# Patient Record
Sex: MALE | Race: WHITE | NOT HISPANIC OR LATINO | Employment: FULL TIME | URBAN - METROPOLITAN AREA
[De-identification: names, ages, dates, MRNs, and addresses within clinical notes are randomized per-mention and may not be internally consistent; named-entity substitution may affect disease eponyms.]

---

## 2022-03-31 ENCOUNTER — OFFICE VISIT (OUTPATIENT)
Dept: FAMILY MEDICINE | Facility: CLINIC | Age: 32
End: 2022-03-31

## 2022-03-31 DIAGNOSIS — Z78.9 PARTICIPANT IN HEALTH AND WELLNESS PLAN: Primary | ICD-10-CM

## 2022-03-31 PROCEDURE — 99207 PR NO CHARGE LOS: CPT

## 2022-03-31 NOTE — PROGRESS NOTES
"Discussion:    Biometric F/U:  213.5 lbs, BMI 32, /90, Triglycerides 194, Glucose 108, Non-HDL n/a, HDL Ratio n/a, , HDL <15     Discussed implications of larger waist circumference/ BMI & behaviors to help reduce risks & reduce body fat (fresh foods = produce, lean meats, healthy fats/ vs processed foods, limit flour & sugar intake, regular exercise, sleep, manage stress).     Discussed effect elevated BP has on the body & things that can affect BP (exercise, stress, weight, diet, sleep, caffeine, pain, nicotine)    Discussed dangers of elevated levels of triglycerides (metabolic syndrome, increase risk for cardiovascular disease, elevated BP, lowered HDL, elevated glucose, increase belly fat) & behaviors to help decrease triglycerides (fresh foods = produce, lean meats, healthy fats/ vs processed foods, limit flour, sugar & alcohol intake, avoid trans fats, manage weight, increase physical activity).     Discussed dangers of elevated glucose levels & the impact sugar & highly processed carbohydrates has on the body (increase inflammation, dyslipidemia, increased risk for heart attacks, weight gain, premature death, memory in the brain, etc.).     Lifestyle Habits:  Diet/ Eating Habits: produce - 1-2 servings a day (carrots, broccoli, bell peppers, spring mix (no spinach as he's allergic), pasta 1d/week, trying to get in weekly meal planning with 9 month old at home, limited bread due to fatty liver disease. Beverages -  diet pop a couple a week, alcohol - light beer 1d/week. Protein -  Protein shake, ground flaxseed, fiber, chicken. Reports loves desert & ice cream, \"I am aware that I need to clean this up.\"    Physical Activity: no activity right now, hoping to get out for walks with baby once weather improves. Weight machine at home that can use    Sleep: 7-7.5 hrs    Stress: 3/10    Goals:  - Increase vegetable intake  - Be more cognitive about sweets & my sugar intake  - Exercise " 3d/week    Motivators:   - My daughter  - Comfortable, don't want to have to worry about my health & stay medication free as long as possible  - Live life & enjoy a good quality of life    QUE Navarro, [facility]

## 2022-10-10 ENCOUNTER — OFFICE VISIT (OUTPATIENT)
Dept: FAMILY MEDICINE | Facility: CLINIC | Age: 32
End: 2022-10-10
Payer: COMMERCIAL

## 2022-10-10 VITALS
DIASTOLIC BLOOD PRESSURE: 70 MMHG | OXYGEN SATURATION: 96 % | TEMPERATURE: 96.7 F | SYSTOLIC BLOOD PRESSURE: 114 MMHG | WEIGHT: 208 LBS | HEART RATE: 73 BPM

## 2022-10-10 DIAGNOSIS — H69.93 DYSFUNCTION OF BOTH EUSTACHIAN TUBES: Primary | ICD-10-CM

## 2022-10-10 DIAGNOSIS — J02.9 SORE THROAT: ICD-10-CM

## 2022-10-10 LAB
DEPRECATED S PYO AG THROAT QL EIA: NEGATIVE
GROUP A STREP BY PCR: NOT DETECTED

## 2022-10-10 PROCEDURE — 87651 STREP A DNA AMP PROBE: CPT | Performed by: NURSE PRACTITIONER

## 2022-10-10 PROCEDURE — 99213 OFFICE O/P EST LOW 20 MIN: CPT | Performed by: NURSE PRACTITIONER

## 2022-10-10 RX ORDER — FLUTICASONE PROPIONATE 50 MCG
1 SPRAY, SUSPENSION (ML) NASAL DAILY
Qty: 16 G | Refills: 0 | Status: SHIPPED | OUTPATIENT
Start: 2022-10-10 | End: 2023-08-04

## 2022-10-10 ASSESSMENT — ENCOUNTER SYMPTOMS
SORE THROAT: 1
GASTROINTESTINAL NEGATIVE: 1
COUGH: 1
SINUS PRESSURE: 0
RHINORRHEA: 0
HEADACHES: 0
FEVER: 0
WHEEZING: 0
MYALGIAS: 0
FATIGUE: 0
CONSTITUTIONAL NEGATIVE: 1
SHORTNESS OF BREATH: 0

## 2022-10-10 NOTE — PATIENT INSTRUCTIONS
Eustachian Tube Dysfunction (tube that helps drain your sinuses is inflamed):   1. Use Flonase daily until symptoms improve.   2. Humidification and hydration will help.   3. Increase fluid intake to help thin the fluid in your ears so it drains better.    Your rapid strep test was negative. You have viral pharyngitis (sore throat). We will contact you when the throat culture comes back.  You may take Ibuprofen and/or Tylenol for pain/fevers.  Other supportive therapy to try: cepacol throat lozenges, salt water gargles, soft and cold foods.  Rest and stay hydrated.  If symptoms worsen or do not improve over the next week, follow-up with your PCP.

## 2022-10-10 NOTE — PROGRESS NOTES
Assessment & Plan     Dysfunction of both eustachian tubes  Negative strep. Suspect fluid behind TM causing symptoms. Flonase prescribed and dispensed in clinic. Side effects, risks and benefits of medication were discussed with patient. Discussed how and when to take medication.     - fluticasone (FLONASE) 50 MCG/ACT nasal spray; Spray 1 spray into both nostrils daily    Sore throat  Negative strep. Will send of PCR.     - Streptococcus A Rapid Scr w Reflx to PCR  - Group A Streptococcus PCR Throat Swab             Patient Instructions   Eustachian Tube Dysfunction (tube that helps drain your sinuses is inflamed):   1. Use Flonase daily until symptoms improve.   2. Humidification and hydration will help.   3. Increase fluid intake to help thin the fluid in your ears so it drains better.    Your rapid strep test was negative. You have viral pharyngitis (sore throat). We will contact you when the throat culture comes back.  1. You may take Ibuprofen and/or Tylenol for pain/fevers.  2. Other supportive therapy to try: cepacol throat lozenges, salt water gargles, soft and cold foods.  3. Rest and stay hydrated.  4. If symptoms worsen or do not improve over the next week, follow-up with your PCP.        No follow-ups on file.    [facility]    Alejandro Valero is a 32 year old, presenting for the following health issues:  Pharyngitis      HPI     Acute Illness  Acute illness concerns: Sore Throat  Onset/Duration: 1 1/2 weeks  Symptoms:  Fever: No  Chills/Sweats: No  Headache (location?): No  Sinus Pressure: No  Conjunctivitis:  No  Ear Pain: no  Rhinorrhea: No  Congestion: No  Sore Throat: YES  Cough: Just a little  Wheeze: No  Decreased Appetite: No  Nausea: No  Vomiting: No  Diarrhea: No  Dysuria/Freq.: No  Dysuria or Hematuria: No  Fatigue/Achiness: No  Sick/Strep Exposure: Unknown    Additional provider notes: Patient presents in clinic with 1.5 week hx of  sore throat. Daughter also having cold symptoms.     No Known Allergies    No current outpatient medications on file.     No current facility-administered medications for this visit.       History reviewed. No pertinent past medical history.       Review of Systems   Constitutional: Negative.  Negative for fatigue and fever.   HENT: Positive for sore throat. Negative for congestion, ear pain, postnasal drip, rhinorrhea and sinus pressure.    Respiratory: Positive for cough (mild). Negative for shortness of breath and wheezing.    Gastrointestinal: Negative.    Musculoskeletal: Negative for myalgias.   Neurological: Negative for headaches.            Objective    /70 (BP Location: Left arm, Patient Position: Sitting, Cuff Size: Adult Large)   Pulse 73   Temp (!) 96.7  F (35.9  C) (Tympanic)   Wt 94.3 kg (208 lb)   SpO2 96%   There is no height or weight on file to calculate BMI.  Physical Exam  Vitals reviewed.   Constitutional:       General: He is not in acute distress.     Appearance: Normal appearance. He is not ill-appearing or toxic-appearing.   HENT:      Head: Normocephalic and atraumatic.      Right Ear: Ear canal and external ear normal. Tympanic membrane is bulging.      Left Ear: Ear canal and external ear normal. Tympanic membrane is bulging.      Nose: Nose normal.      Mouth/Throat:      Mouth: Mucous membranes are moist.      Pharynx: Oropharynx is clear. Posterior oropharyngeal erythema present.   Cardiovascular:      Rate and Rhythm: Normal rate and regular rhythm.      Pulses: Normal pulses.      Heart sounds: Normal heart sounds.   Pulmonary:      Effort: Pulmonary effort is normal.      Breath sounds: Normal breath sounds.   Musculoskeletal:         General: Normal range of motion.      Cervical back: Normal range of motion and neck supple. No tenderness.   Lymphadenopathy:      Cervical: No cervical adenopathy.   Skin:     General: Skin is warm and dry.   Neurological:      Mental  Status: He is alert and oriented to person, place, and time.   Psychiatric:         Behavior: Behavior normal.            Results for orders placed or performed in visit on 10/10/22   Streptococcus A Rapid Scr w Reflx to PCR     Status: Normal    Specimen: Throat; Swab   Result Value Ref Range    Group A Strep antigen Negative Negative   Group A Streptococcus PCR Throat Swab     Status: Normal    Specimen: Throat; Swab   Result Value Ref Range    Group A strep by PCR Not Detected Not Detected    Narrative    The Xpert Xpress Strep A test, performed on the [employer], is a rapid, qualitative in vitro diagnostic test for the detection of Streptococcus pyogenes (Group A ß-hemolytic Streptococcus, Strep A) in throat swab specimens from patients with signs and symptoms of pharyngitis. The Xpert Xpress Strep A test can be used as an aid in the diagnosis of Group A Streptococcal pharyngitis. The assay is not intended to monitor treatment for Group A Streptococcus infections. The Xpert Xpress Strep A test utilizes an automated real-time polymerase chain reaction (PCR) to detect Streptococcus pyogenes DNA.

## 2023-08-04 ENCOUNTER — OFFICE VISIT (OUTPATIENT)
Dept: URGENT CARE | Facility: URGENT CARE | Age: 33
End: 2023-08-04
Payer: COMMERCIAL

## 2023-08-04 VITALS
SYSTOLIC BLOOD PRESSURE: 136 MMHG | WEIGHT: 212 LBS | DIASTOLIC BLOOD PRESSURE: 97 MMHG | OXYGEN SATURATION: 98 % | TEMPERATURE: 98.3 F | HEART RATE: 87 BPM | RESPIRATION RATE: 12 BRPM

## 2023-08-04 DIAGNOSIS — J02.9 SORE THROAT: ICD-10-CM

## 2023-08-04 DIAGNOSIS — B08.4 HAND, FOOT AND MOUTH DISEASE: Primary | ICD-10-CM

## 2023-08-04 LAB
DEPRECATED S PYO AG THROAT QL EIA: NEGATIVE
GROUP A STREP BY PCR: NOT DETECTED

## 2023-08-04 PROCEDURE — 87651 STREP A DNA AMP PROBE: CPT | Performed by: NURSE PRACTITIONER

## 2023-08-04 PROCEDURE — 99214 OFFICE O/P EST MOD 30 MIN: CPT | Performed by: NURSE PRACTITIONER

## 2023-08-04 NOTE — PROGRESS NOTES
Assessment & Plan     1. Hand, foot and mouth disease    Home care reviewed. Patient verbalized understanding; will monitor symptoms closely. Reviewed s/e to medications.   Follow up with primary care in 1 week if symptoms not improving.     Handout given from epic and reviewed.    2. Sore throat  Pending strep culture    - Streptococcus A Rapid Screen w/Reflex to PCR - Clinic Collect  - Group A Streptococcus PCR Throat Swab      Sherie Gates, PATRICK CNP  [facility]    Subjective     Anjum is a 32 year old male who presents to clinic today for the following health issues:  Chief Complaint   Patient presents with    Problems Swallowing     Per pt painful swallowing sine Tuesday. [facility] and had [facility] test negative.    Lesion     Bumps and lesions on hands      HPI    Patient presents to clinic states approximately 4 days ago started to have sore throat [facility] and [facility] for rapid strep.  Patient states that pain has increased he is now noticing lesions on the back of his throat soft palate and bilateral hands.  States child recently had hand-foot-and-mouth.  He notes pain with swallowing, denies difficulty swallowing or breathing fever, cough, congestion or shortness of breath.      Review of Systems  Constitutional, HEENT, cardiovascular, pulmonary, gi and gu systems are negative, except as otherwise noted.      Objective    BP (!) 136/97   Pulse 87   Temp 98.3  F (36.8  C) (Tympanic)   Resp 12   Wt 96.2 kg (212 lb)   SpO2 98%   Physical Exam   GENERAL: healthy, alert and no distress  EYES: Eyes grossly normal to inspection, PERRL and conjunctivae and sclerae normal  HENT: normal cephalic/atraumatic, ear canals and TM's normal, nose and mouth without ulcers or lesions, oropharynx clear, oral mucous membranes moist, tonsillar erythema, and lesions soft palate papular nondraining.  NECK: bilateral anterior cervical  adenopathy, no asymmetry, masses, or scars, and thyroid normal to palpation  RESP: lungs clear to auscultation - no rales, rhonchi or wheezes  CV: regular rate and rhythm, normal S1 S2, no S3 or S4, no murmur, click or rub, no peripheral edema and peripheral pulses strong  ABDOMEN: soft, nontender, no hepatosplenomegaly, no masses and bowel sounds normal  MS: no gross musculoskeletal defects noted, no edema  SKIN: Small papular rash bilateral hands palms and back of throat as noted above.  Dry negative for swelling mild erythema.    Results for orders placed or performed in visit on 08/04/23   Streptococcus A Rapid Screen w/Reflex to PCR - Clinic Collect     Status: Normal    Specimen: Throat; Swab   Result Value Ref Range    Group A Strep antigen Negative Negative

## 2023-08-09 ENCOUNTER — TELEPHONE (OUTPATIENT)
Dept: URGENT CARE | Facility: URGENT CARE | Age: 33
End: 2023-08-09
Payer: COMMERCIAL

## 2023-08-09 NOTE — TELEPHONE ENCOUNTER
Patient calling to follow up on urgent care visit from 8/4/23. He is wondering when it would be okay to return to the office. He states he works as an  and does not have much contact with people.     He states he developed symptoms last Tuesday, 8/1/23.     He states he has not had a fever since last Wednesday, 8/2/23.     Currently, he still has a sore throat when he swallows and he has a few unopened bumps on his hands. He states they never opened.     RN provided guidance per CDC guidelines for hand, foot, and mouth disease. RN provided education for how HFMD spreads and how to prevent spreading it to others. Patient verbalized understanding and denies further questions or concerns at this time.     QUE FoyN, RN  [facility]  Nurse Triage, Family Practice

## 2023-09-24 ENCOUNTER — HEALTH MAINTENANCE LETTER (OUTPATIENT)
Age: 33
End: 2023-09-24

## 2024-11-16 ENCOUNTER — HEALTH MAINTENANCE LETTER (OUTPATIENT)
Age: 34
End: 2024-11-16

## 2025-04-13 ENCOUNTER — OFFICE VISIT (OUTPATIENT)
Dept: URGENT CARE | Facility: URGENT CARE | Age: 35
End: 2025-04-13
Payer: COMMERCIAL

## 2025-04-13 VITALS
OXYGEN SATURATION: 96 % | SYSTOLIC BLOOD PRESSURE: 124 MMHG | HEART RATE: 138 BPM | HEIGHT: 70 IN | RESPIRATION RATE: 18 BRPM | BODY MASS INDEX: 31.07 KG/M2 | TEMPERATURE: 100.7 F | WEIGHT: 217 LBS | DIASTOLIC BLOOD PRESSURE: 88 MMHG

## 2025-04-13 DIAGNOSIS — R50.9 FEVER, UNSPECIFIED FEVER CAUSE: ICD-10-CM

## 2025-04-13 DIAGNOSIS — J02.9 SORE THROAT: Primary | ICD-10-CM

## 2025-04-13 DIAGNOSIS — J02.0 STREP THROAT: ICD-10-CM

## 2025-04-13 DIAGNOSIS — R00.0 SINUS TACHYCARDIA: ICD-10-CM

## 2025-04-13 LAB
DEPRECATED S PYO AG THROAT QL EIA: POSITIVE
FLUAV AG SPEC QL IA: NEGATIVE
FLUBV AG SPEC QL IA: NEGATIVE

## 2025-04-13 PROCEDURE — 87804 INFLUENZA ASSAY W/OPTIC: CPT | Performed by: NURSE PRACTITIONER

## 2025-04-13 PROCEDURE — 93000 ELECTROCARDIOGRAM COMPLETE: CPT | Performed by: NURSE PRACTITIONER

## 2025-04-13 PROCEDURE — 1125F AMNT PAIN NOTED PAIN PRSNT: CPT | Performed by: NURSE PRACTITIONER

## 2025-04-13 PROCEDURE — 3074F SYST BP LT 130 MM HG: CPT | Performed by: NURSE PRACTITIONER

## 2025-04-13 PROCEDURE — 99214 OFFICE O/P EST MOD 30 MIN: CPT | Performed by: NURSE PRACTITIONER

## 2025-04-13 PROCEDURE — 3079F DIAST BP 80-89 MM HG: CPT | Performed by: NURSE PRACTITIONER

## 2025-04-13 PROCEDURE — 87880 STREP A ASSAY W/OPTIC: CPT | Performed by: NURSE PRACTITIONER

## 2025-04-13 RX ORDER — AMOXICILLIN 500 MG/1
500 CAPSULE ORAL 2 TIMES DAILY
Qty: 20 CAPSULE | Refills: 0 | Status: SHIPPED | OUTPATIENT
Start: 2025-04-13 | End: 2025-04-23

## 2025-04-13 ASSESSMENT — PAIN SCALES - GENERAL: PAINLEVEL_OUTOF10: SEVERE PAIN (8)

## 2025-04-13 NOTE — PROGRESS NOTES
Urgent Care Clinic Visit    Chief Complaint   Patient presents with    Pharyngitis     Shakes, chills, sweating, body aches, sore throat, trouble swallowing                4/13/2025     9:13 AM   Additional Questions   Roomed by Kathya   Accompanied by Self     No  Does the patient have a sore throat and either history of fever >100.4 in the previous 24 hours without a cough or recent exposure to a known case of strep throat? Yes   Pre-Provider Visit Orders- Influenza  Is this currently Influenza testing season?:  Yes  Does the patient present with a fever and either bodyaches, fatigue, or cough that have been present less than 48 hours? Yes

## 2025-04-13 NOTE — PROGRESS NOTES
"SUBJECTIVE:   Anjum Villatoro  is a 34 year old male who is here today because of: Sore Throat.  The patient has had symptoms of fever and sore throat.   Onset of symptoms was 1 days ago. Course of illness is worsening.  Patient denies exposure to illness at home or work/school.   Patient denies none  Treatment measures tried include acetaminophen, ibuprofen.    No past medical history on file.    Social History     Tobacco Use    Smoking status: Never    Smokeless tobacco: Never       ROS:  Review of systems negative except as stated above.      OBJECTIVE:   /88 (BP Location: Left arm, Patient Position: Sitting, Cuff Size: Adult Large)   Pulse (!) 151   Temp 100.7  F (38.2  C) (Oral)   Resp 18   Ht 1.778 m (5' 10\")   Wt 98.4 kg (217 lb)   SpO2 96%   BMI 31.14 kg/m    General: healthy, alert and no distress  Eyes - conjunctivae clear.  Ears - External ears normal. Canals clear. TM's normal.  Nose/Sinuses - Nares normal.Mucosa normal. No drainage or sinus tenderness.  Oropharynx - Lips, mucosa, and tongueOr normal. Positive findings: oropharyngeal erythema, tonsillar hypertrophy exudates present,   Neck - Neck supple; Positive findings: moderate anterior cervical nodes,   Lungs - Lungs clear; no wheezing or rales.  Heart - regular rate and rhythm. No murmurs, rub.    Labs:  Results for orders placed or performed in visit on 04/13/25   Streptococcus A Rapid Screen w/Reflex to PCR - Clinic Collect     Status: Abnormal    Specimen: Throat; Swab   Result Value Ref Range    Group A Strep antigen Positive (A) Negative         ASSESSMENT:   (J02.9) Sore throat  (primary encounter diagnosis)  Comment:   Plan: Streptococcus A Rapid Screen w/Reflex to PCR -         Clinic Collect         (R50.9) Fever, unspecified fever cause  Comment: Influenza negative patient has not taken any ibuprofen fever mildly elevated heart rate was noted to be in the 150s to go down to the 130s EKG showed sinus tach no SVT patient " instructed to hydrate and take ibuprofen and monitor his heart rate  Plan: Influenza A & B Antigen - Clinic Collect         (J02.0) Strep throat  Comment:   Plan: amoxicillin (AMOXIL) 500 MG capsule            (R00.0) Sinus tachycardia  Comment: See above  Plan: EKG 12-lead complete w/read - Clinics            PLAN:  Amoxicillin 500 mg two times per day for 10 days.  Symptomatic treatment with fluids, rest.  May use acetaminophen, ibuprofen prn.  Patient may return to work/school after 24 hours of antibiotic treatment and symptoms have improved.  RTC if any worsening symptoms or if not improving.     Berenice Ross CNP